# Patient Record
Sex: MALE | URBAN - METROPOLITAN AREA
[De-identification: names, ages, dates, MRNs, and addresses within clinical notes are randomized per-mention and may not be internally consistent; named-entity substitution may affect disease eponyms.]

---

## 2017-05-18 NOTE — PATIENT DISCUSSION
(J90.959) Vitreous degeneration, left eye - Assesment : Examination revealed PVD. No changes reported. No holes or tears noted today. - Plan : Monitor for changes. Advised patient to call our office with decreased vision or an increase in flashes and/or floaters.

## 2017-05-18 NOTE — PATIENT DISCUSSION
(J20.879) Keratoconjunct sicca, not specified as Sjogren's, bilateral - Assesment : Examination revealed Dry Eye Syndrome. No iritis, no PCO, no infection noted today. Pt reports uses ATs several times per day. - Plan : Monitor for changes. Continue and increase AT and Gel AT use. Refresh Optive Advance sample and coupon given today. Discussed option of trying Restasis or Xiidra, Pt prefers to wait. Recommended starting Omega-3's or Fish Oils. Pt instructed to call if symptoms worsen, do not improve, or new symptoms or vision changes occur. RTC in 1 year for Exam, sooner if problems or changes occur.

## 2018-05-17 NOTE — PATIENT DISCUSSION
(D98.164) Other secondary cataract, bilateral - Assesment : Trace posterior capsule opacification present OU. - Plan : Monitor for changes. Advised patient to call our office with decreased vision or increased symptoms.

## 2018-05-17 NOTE — PATIENT DISCUSSION
(C50.008) Keratoconjunct sicca, not specified as Sjogren's, bilateral - Assesment : Examination revealed Dry Eye Syndrome. - Plan : Monitor for changes. Continue ATs. RTC in 1 year for Exam, sooner if problems or changes occur.

## 2019-07-29 NOTE — PATIENT DISCUSSION
(R07.896) Dermatochalasis of left upper eyelid - Assesment : Examination revealed Dermatochalasis. Pt is bothered and symptomatic.   Hx of BULB in past. - Plan : Refer to Dr. Sondra Corey for Lid Eval.

## 2019-07-29 NOTE — PATIENT DISCUSSION
(F12.665) Dermatochalasis of right upper eyelid - Assesment : Examination revealed Dermatochalasis. Pt is bothered and symptomatic.   Hx of BULB in past. - Plan : Refer to Dr. Xi William for Lid Eval.

## 2019-07-29 NOTE — PATIENT DISCUSSION
(F11.158) Other secondary cataract, bilateral - Assesment : Mild posterior capsule opacification present OU. - Plan : Monitor for changes. Advised patient to call our office with decreased vision or increased symptoms. RTC in 1 year for Exam, sooner if problems or changes.

## 2019-07-29 NOTE — PATIENT DISCUSSION
(A36.914) Dermatochalasis of right upper eyelid - Assesment : Examination revealed Dermatochalasis. Pt is bothered and symptomatic.   Hx of BULB in past. - Plan : Refer to Dr. Terrance Mejía for Lid Eval.

## 2019-07-29 NOTE — PATIENT DISCUSSION
(H79.544) Other secondary cataract, bilateral - Assesment : Mild posterior capsule opacification present OU. - Plan : Monitor for changes. Advised patient to call our office with decreased vision or increased symptoms. RTC in 1 year for Exam, sooner if problems or changes.

## 2019-07-29 NOTE — PATIENT DISCUSSION
(S05.387) Dermatochalasis of left upper eyelid - Assesment : Examination revealed Dermatochalasis. Pt is bothered and symptomatic.   Hx of BULB in past. - Plan : Refer to Dr. Arsalan Burnette for Lid Eval.

## 2019-07-29 NOTE — PATIENT DISCUSSION
(Y71.450) Keratoconjunct sicca, not specified as Sjogren's, bilateral - Assesment : Examination revealed Dry Eye Syndrome. - Plan : Monitor for changes. Continue using ATs daily and increase prn. RTC in 1 year for Exam, sooner if problems or changes occur.

## 2019-07-29 NOTE — PATIENT DISCUSSION
(O63.377) Keratoconjunct sicca, not specified as Sjogren's, bilateral - Assesment : Examination revealed Dry Eye Syndrome. - Plan : Monitor for changes. Continue using ATs daily and increase prn. RTC in 1 year for Exam, sooner if problems or changes occur.

## 2020-04-27 NOTE — PATIENT DISCUSSION
Recommended increasing AT use while bothered. Start Erythromycin tigist qhs OS for 1 week. E-Rx sent to pharmacy.

## 2020-04-27 NOTE — PATIENT DISCUSSION
Telemed visit performed today. Pt noted to have mild injection and mild dry eye. Will cover for infection.

## 2020-04-27 NOTE — PATIENT DISCUSSION
Verbal consent was given by patient to conduct Telemedicine exam and Telemedicine checklist was completed with patient.

## 2020-10-02 ENCOUNTER — IMPORTED ENCOUNTER (OUTPATIENT)
Dept: URBAN - METROPOLITAN AREA CLINIC 9 | Facility: CLINIC | Age: 65
End: 2020-10-02

## 2020-11-17 NOTE — PROCEDURE NOTE: CLINICAL
PROCEDURE NOTE: Punctal Plugs, Extended #2 Bilateral Lower Lids. Diagnosis: Keratoconjunctivitis Sicca, Not Specified As Sjögren's. R/B/A's discussed, including risk of infection, increased tearing, loss of vision, loss of eye. Pt given Proparacaine drops. Punctal dilator used to dilate punctum OU. One plug inserted into lower punctum OU with jewelers at slit lamp. No complications. Amena Juarez

## 2020-11-17 NOTE — PATIENT DISCUSSION
Recommended ATs 2-4 times per day and increase prn and continue Gel ATs qhs after completes E-mycin.

## 2020-11-17 NOTE — PATIENT DISCUSSION
Punctal plugs inserted OU today with no complications. Pt advised that plugs will dissolve in 3-6 months and, if improves symptoms, can call for repeat plugs when symptoms return.

## 2021-03-09 ENCOUNTER — IMPORTED ENCOUNTER (OUTPATIENT)
Dept: URBAN - METROPOLITAN AREA CLINIC 9 | Facility: CLINIC | Age: 66
End: 2021-03-09

## 2021-10-19 ASSESSMENT — TONOMETRY
OS_IOP_MMHG: 18
OS_IOP_MMHG: 13
OD_IOP_MMHG: 11
OD_IOP_MMHG: 17

## 2021-10-19 ASSESSMENT — VISUAL ACUITY
OS_CC: 20/20 SN
OD_CC: 20/25 SN
OS_SC: 20/200 SN
OD_CC: 20/20 SN
OD_CC: 20/25 SN
OS_CC: 20/20 SN
OD_SC: 20/400 SN

## 2021-10-19 ASSESSMENT — KERATOMETRY
OS_AXISANGLE_DEGREES: 7
OD_AXISANGLE2_DEGREES: 91
OS_K2POWER_DIOPTERS: 43.5
OS_K1POWER_DIOPTERS: 43.25
OD_K1POWER_DIOPTERS: 43
OS_AXISANGLE2_DEGREES: 97
OD_AXISANGLE_DEGREES: 1
OD_K2POWER_DIOPTERS: 43.25